# Patient Record
Sex: MALE | Race: BLACK OR AFRICAN AMERICAN | Employment: FULL TIME | URBAN - METROPOLITAN AREA
[De-identification: names, ages, dates, MRNs, and addresses within clinical notes are randomized per-mention and may not be internally consistent; named-entity substitution may affect disease eponyms.]

---

## 2020-03-31 ENCOUNTER — OFFICE VISIT (OUTPATIENT)
Dept: NEUROLOGY | Age: 55
End: 2020-03-31

## 2020-03-31 VITALS
HEIGHT: 71 IN | BODY MASS INDEX: 30.94 KG/M2 | WEIGHT: 221 LBS | DIASTOLIC BLOOD PRESSURE: 98 MMHG | HEART RATE: 77 BPM | TEMPERATURE: 98.7 F | OXYGEN SATURATION: 97 % | RESPIRATION RATE: 18 BRPM | SYSTOLIC BLOOD PRESSURE: 148 MMHG

## 2020-03-31 DIAGNOSIS — R41.3 MEMORY LOSS: Primary | ICD-10-CM

## 2020-03-31 DIAGNOSIS — R41.0 CONFUSION: ICD-10-CM

## 2020-03-31 RX ORDER — METFORMIN HYDROCHLORIDE 500 MG/1
2 TABLET, EXTENDED RELEASE ORAL 2 TIMES DAILY
COMMUNITY
Start: 2020-03-08

## 2020-03-31 RX ORDER — DAPAGLIFLOZIN 10 MG/1
1 TABLET, FILM COATED ORAL DAILY
COMMUNITY
Start: 2020-03-08

## 2020-03-31 NOTE — PATIENT INSTRUCTIONS
RESULT POLICY If we have ordered testing for you, know that; \"NO NEWS IS GOOD NEWS! \" It is our policy that we know longer call patients with results, nor do we  give test results over the phone. We schedule follow up appointments so that your results can be discussed in person. This allows you to address any questions you have regarding the results. If you choose to go to an imaging center outside of [facility], it is your responsibility to bring imaging report and disc to follow up appointment. If something of concern is revealed on your test, we will contact you to discuss the matter and if needed schedule a sooner follow up appointment. Additionally, results may be found by using the My Chart feature and one of our patient service representatives at the  can give you instructions on how to access this feature to utilize our electronic medical record system. Thank you for your understanding. PRESCRIPTION REFILL POLICY [facility] Statement to Patients April 1, 2014 In an effort to ensure the large volume of patient prescription refills is processed in the most efficient and expeditious manner, we are asking our patients to assist us by calling your Pharmacy for all prescription refills, this will include also your  Mail Order Pharmacy. The pharmacy will contact our office electronically to continue the refill process. Please do not wait until the last minute to call your pharmacy. We need at least 48 hours (2days) to fill prescriptions. We also encourage you to call your pharmacy before going to  your prescription to make sure it is ready. With regard to controlled substance prescription refill requests (narcotic refills) that need to be picked up at our office, we ask your cooperation by providing us with at least 72 hours (3days) notice that you will need a refill. We will not refill narcotic prescription refill requests after 4:00pm on any weekday, Monday through Thursday, or after 2:00pm on Fridays, or on the weekends. We encourage everyone to explore another way of getting your prescription refill request processed using [employer], our patient web portal through our electronic medical record system. [employer] is an efficient and effective way to communicate your medication request directly to the office and  downloadable as an debra on your smart phone . [employer] also features a review functionality that allows you to view your medication list as well as leave messages for your physician. Are you ready to get connected? If so please review the attatched instructions or speak to any of our staff to get you set up right away! Thank you so much for your cooperation. Should you have any questions please contact our Practice Administrator.

## 2020-03-31 NOTE — PROGRESS NOTES
[facility] and [address] at [facility] at [address] [place] [address], [address] [facility] [address]  [phone] Office  [phone]           Referring: No primary care provider on file. Chief Complaint   Patient presents with    New Patient    Memory Loss     27-year-old right-handed gentleman who comes today for evaluation of what he calls short-term memory loss as well as long-term memory loss with increasing frequency. He says that his symptoms first became apparent maybe 1-2 years ago but over the last several months have been increasing in severity. He notes that he has gotten forgetful to the point that he is getting in trouble at work. He works in a real yard. 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 In addition the patient lost a set of work keys. He has no recollection of how he lost him. He has no recollection of the last time he used them. 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  He has never found the work keys. There have been other issues where his supervisor has commented he is not performing as well as he should. He has not gotten a formal reprimand although he has been discussed verbally his shortcomings. His wife also notes that he has been less attentive and sometimes is said to have selective memory. He has no difficulty paying bills and that he does not forget them or pay them twice. He has not gotten lost while driving. No difficulty with speech or language. His mother did have dementia but she was 80. No focal weakness. No palpitations. No fever. No chills.   No illness or injury. No inciting factor for this that he can think of. No changes in medicine relating. Past Medical History:   Diagnosis Date    Diabetes mellitus type II, controlled ([address]     Seasonal allergic rhinitis        Past Surgical History:   Procedure Laterality Date    HX COLONOSCOPY  2019       Current Outpatient Medications   Medication Sig Dispense Refill    metFORMIN ER (GLUCOPHAGE XR) 500 mg tablet Take 2 Tabs by mouth two (2) times a day.  Farxiga 10 mg tab tablet Take 1 Tab by mouth daily. No Known Allergies    Social History     Tobacco Use    Smoking status: Never Smoker    Smokeless tobacco: Never Used   Substance Use Topics    Alcohol use: Yes     Comment: sometimes 1-2 drinks per week     Drug use: Never       Family History   Problem Relation Age of Onset   [employer] Breast Cancer Mother     Dementia Mother     Heart Attack Father        Review of Systems  Pertinent positives and negatives as noted with remainder of comprehensive review negative      Examination  Visit Vitals  BP (!) 148/98 (BP 1 Location: Left arm, BP Patient Position: Sitting)   Pulse 77   Temp 98.7 °F (37.1 °C) (Oral)   Resp 18   Ht 5' 11\" (1.803 m)   Wt 100.2 kg (221 lb)   SpO2 97%   BMI 30.82 kg/m²     Pleasant, well appearing. Dress and grooming are appropriate. No scleral icterus is present. Oropharynx is clear. Supple neck without bruit appreciated. Heart regular. Pulses are symmetrical.  No edema in the lower extremities. Neurologically, he is awake, alert, oriented with normal speech and language. He answers orientation questions, discusses medical history, current events etc.  Visual fields are full to direct confrontational testing. He has sharp disk margins bilaterally. Pupils react bilaterally. He has full versions without nystagmus. Face is symmetrical with symmetrical facial sensation. Tongue and palate are midline.  Shoulder shrug is symmetrical. Hearing is intact to conversation. He has normal bulk and tone. There is no abnormal movement. There is no pronation or drift. He is able to generate full strength in the upper and lower extremities and all muscle groups to direct confrontational testing. Reflexes are symmetrical in the upper and lower extremities bilaterally. No pathologic reflexes are elicited. He has no ataxia or past pointing. Sensory examination is intact to primary modalities and there is no lateralization of sensation. He is able to ambulate without difficulty    Impression/Plan  69-year-old gentleman with complaints of progressive cognitive decline to the point that it is affecting his job as noted above and certainly differential diagnosis would include a premature onset dementia versus processing issue versus attention deficit versus emotional versus structural versus vascular versus ictal versus metabolic versus other. To that end he will get an MRI of the brain as well as a carotid Doppler EEG B12 thyroid function and formal neuropsychological evaluation. He will return at the conclusion of his evaluation. Carine Virk MD    This note was created using voice recognition software. Despite editing, there may be syntax errors. This note will not be viewable in [address]

## 2020-03-31 NOTE — PROGRESS NOTES
Chief Complaint   Patient presents with    New Patient    Memory Loss     Concerned with STM loss, some LTM loss, lack of focus. Always had some issues such as remembering names. Works for [employer] and misplaced work keys which was last straw to seeking medical advice.     Does have some stress at home, going through bankruptcy

## 2020-04-01 LAB
T4 FREE SERPL-MCNC: 1.2 NG/DL (ref 0.82–1.77)
TSH SERPL DL<=0.005 MIU/L-ACNC: 2.74 UIU/ML (ref 0.45–4.5)
VIT B12 SERPL-MCNC: 308 PG/ML (ref 232–1245)

## 2020-04-06 ENCOUNTER — OFFICE VISIT (OUTPATIENT)
Dept: NEUROLOGY | Age: 55
End: 2020-04-06

## 2020-04-06 DIAGNOSIS — R41.3 SHORT-TERM MEMORY LOSS: ICD-10-CM

## 2020-04-06 DIAGNOSIS — G31.84 MILD COGNITIVE IMPAIRMENT: Primary | ICD-10-CM

## 2020-04-06 DIAGNOSIS — Z81.8 FAMILY HISTORY OF DEMENTIA: ICD-10-CM

## 2020-04-06 NOTE — PROGRESS NOTES
[address] [facility],[address]  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 right handed    who was unaccompanied to the initial clinical interview on 4/6/20. Please refer to his medical records for details pertaining to his history. At the start of the appointment, I reviewed the patient's [facility] Epic Chart (including Media scanned in from previous providers) for the active Problem List, all pertinent Past Medical Hx, medications, recent radiologic and laboratory findings. In addition, I reviewed pt's documented Immunization Record and Encounter History. Pursuant to the emergency declaration under the [facility] waiver authority and the [employer] and [employer] Act, this Virtual  Visit was conducted, with appropriate consent obtained, to reduce the patient's risk of exposure to COVID-19 and provide continuity of care   Services were provided in this manner to substitute for in-person clinic visit. The originating site is the patient's home and the distance site is [facility] at the [facility]. These types of teleneuropsychology/telehealth/telemedicine visits were authorized by the President of the [place], though I/we cannot guarantee what a third party payor will do reimbursement/coverage wise. I indicated that I would evaluate the patient and recommend diagnostics and treatment based on my assessment and impressions, and that our sessions are not being recorded and that personal health information is protected to the best of our abilities.         The patient reported completing college without history of previously diagnosed LD and/or receipt of special education service. He is a  with the [employer]. The patient has beenn noticing problems with both short and long term memory. He lost keys. Problems began 2 years ago and progressively worse over the past month. Starts tasks and does not complete. He had a switch key which was used to unlock a switchbox, multiple ones. 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  He has never found the work keys. There have been other issues where his supervisor has commented he is not performing as well as he should. He has not gotten a formal reprimand although he has been discussed verbally his shortcomings. His wife has also noticed those type of issues about it as well. He has no problems with driving, medications, finances, day-to-day chores, etc. He has a hard time with focus and attention. No background stroke, meningitis/encephalitis, BALJIT Fever, Lupus, Lyme, TBI, etc, etc.  He sometimes forgets names. His mother had dementia. He has no acute or focal issues. No palpitations. No fever. No chills. No illness or injury. No inciting factor for this that he can think of. No changes in medicine relating. He does have diabetic and manages his sugars appropriately. He uses a CPAP device at night. No prior psychiatric history. Anxious about his health though. No previous neuropsych.        MRI and Doppler's are pending    Neuropsychological Mental Status Exam (NMSE):    Historian: Good  Praxis: No UE apraxia  R/L Orientation: Intact to self and to other  Dress: within normal limits   Weight:Overweight  Appearance/Hygiene: within normal limits   Gait: within normal limits   Assistive Devices: Glasses  Mood: within normal limits   Affect: within normal limits   Comprehension: within normal limits   Thought Process: within normal limits   Expressive Language: within normal limits   Receptive Language: within normal limits   Motor:  No cognitive or motor perseveration  ETOH: 1-2 drinks a week, not more  Tobacco: Denied  Illicit: Denied  SI/HI: Denied  Psychosis: Denied  Insight: Within normal limits  Judgment: Within normal limits  Other Psych:      Past Medical History:   Diagnosis Date    Diabetes mellitus type II, controlled ([facility] [address] [facility]     Seasonal allergic rhinitis        Past Surgical History:   Procedure Laterality Date    HX COLONOSCOPY  2019       No Known Allergies    Family History   Problem Relation Age of Onset    Breast Cancer Mother     Dementia Mother     Heart Attack Father     Heart Disease Other     Cancer Neg Hx     Diabetes Neg Hx     Hypertension Neg Hx     Stroke Neg Hx        Social History     Tobacco Use    Smoking status: Never Smoker    Smokeless tobacco: Never Used   Substance Use Topics    Alcohol use: Yes     Comment: sometimes 1-2 drinks per week     Drug use: Never       Current Outpatient Medications   Medication Sig Dispense Refill    metFORMIN ER (GLUCOPHAGE XR) 500 mg tablet Take 2 Tabs by mouth two (2) times a day.  Farxiga 10 mg tab tablet Take 1 Tab by mouth daily.  oxyCODONE-acetaminophen (PERCOCET) 5-325 mg per tablet Take 1-2 Tabs by mouth every four (4) hours as needed for Pain. 30 Tab 0    ondansetron (ZOFRAN ODT) 4 mg disintegrating tablet Take 1-2 tablets every 6-8 hours as needed for nausea and vomiting. 10 Tab 1         Plan:  Obtain authorization for testing from insurance company. Report to follow once testing, scoring, and interpretation completed. ? Organic based neurocognitive issues versus mood disorder or combination of same. ? Problems organic, functional, or both?  This note will not be viewable in MyChart.

## 2020-04-30 ENCOUNTER — TELEPHONE (OUTPATIENT)
Dept: NEUROLOGY | Age: 55
End: 2020-04-30

## 2020-04-30 NOTE — TELEPHONE ENCOUNTER
----- Message from Windsor Cooks sent at 4/30/2020 11:31 AM EDT -----  Regarding: Dr Eren Cjea first and last name: Sebastián Bray from [facility]      Reason for call:the pt MRI was approved and auth  and will need the original order  faxed to  [facility].  at  (f) [phone], needs to be faxed before his appt on Monday 5/4/2020  at 10:00am       Callback required yes/no and why:no only if there are any further questions       Best contact number(s): [phone]      Details to clarify the request:      Windsor Cooks

## 2020-04-30 NOTE — TELEPHONE ENCOUNTER
[facility] to get additional information and order for the MRI  Best # [phone]

## 2020-05-06 ENCOUNTER — OFFICE VISIT (OUTPATIENT)
Dept: NEUROLOGY | Age: 55
End: 2020-05-06

## 2020-05-06 DIAGNOSIS — G31.84 MILD COGNITIVE IMPAIRMENT: Primary | ICD-10-CM

## 2020-05-11 NOTE — PROCEDURES
EEG:      Date:  05/06/20    Requesting Physician:  Adria Heck. MD Jada    An EEG is requested in this 12-year-old man with confusion and memory loss to evaluate for epileptiform abnormality. Medications:  Medications are listed as Zofran, Farxiga and Glucophage. This tracing is obtained during the awake state. During wakefulness the background consists of diffuse low voltage, fast frequency beta wave activity. Hyperventilation is not performed secondary to COVID-19 precautions. Intermittent photic stimulation induces symmetric posterior driving responses. Sleep is not attained. Interpretation:   This EEG recorded during the awake state is normal.

## 2020-06-15 ENCOUNTER — OFFICE VISIT (OUTPATIENT)
Dept: NEUROLOGY | Age: 55
End: 2020-06-15

## 2020-06-15 VITALS — TEMPERATURE: 96.7 F

## 2020-06-15 DIAGNOSIS — G31.84 MILD COGNITIVE IMPAIRMENT: Primary | ICD-10-CM

## 2020-06-15 DIAGNOSIS — Z81.8 FAMILY HISTORY OF DEMENTIA: ICD-10-CM

## 2020-06-15 DIAGNOSIS — F41.8 ANXIETY ABOUT HEALTH: ICD-10-CM

## 2020-06-15 DIAGNOSIS — R41.840 ATTENTION DEFICIT: ICD-10-CM

## 2020-06-16 NOTE — PROGRESS NOTES
[address] [facility],[address]  [address]   [place] [address] [facility], [address]   [phone] Office   [phone] Fax      Neuropsychological Evaluation Report  Referral:  Walter Black MD, . Wai Vargas is a 60 y.o. right handed   male who was unaccompanied to the initial clinical interview on 4/6/20. Please refer to his medical records for details pertaining to his history. At the start of the appointment, I reviewed the patient's [facility] Epic Chart (including Media scanned in from previous providers) for the active Problem List, all pertinent Past Medical Hx, medications, recent radiologic and laboratory findings. In addition, I reviewed pt's documented Immunization Record and Encounter History. Pursuant to the emergency declaration under the [facility], [facility] waiver authority and the [employer] Act, this Virtual  Visit was conducted, with appropriate consent obtained, to reduce the patient's risk of exposure to COVID-19 and provide continuity of care   Services were provided in this manner to substitute for in-person clinic visit. The originating site is the patient's home and the distance site is [facility] at the [facility]. These types of teleneuropsychology/telehealth/telemedicine visits were authorized by the President of the [place], though I/we cannot guarantee what a third party payor will do reimbursement/coverage wise. I indicated that I would evaluate the patient and recommend diagnostics and treatment based on my assessment and impressions, and that our sessions are not being recorded and that personal health information is protected to the best of our abilities.         The patient reported completing college without history of previously diagnosed LD and/or receipt of special education service. He is a  with the [employer]. The patient has beenn noticing problems with both short and long term memory. He lost keys. Problems began 2 years ago and progressively worse over the past month. Starts tasks and does not complete. He had a switch key which was used to unlock a switchbox, multiple ones. 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  He has never found the work keys. There have been other issues where his supervisor has commented he is not performing as well as he should. He has not gotten a formal reprimand although he has been discussed verbally his shortcomings. His wife has also noticed those type of issues about it as well. He has no problems with driving, medications, finances, day-to-day chores, etc. He has a hard time with focus and attention. No background stroke, meningitis/encephalitis, BALJIT Fever, Lupus, Lyme, TBI, etc, etc.  He sometimes forgets names. His mother had dementia. He has no acute or focal issues. No palpitations. No fever. No chills. No illness or injury. No inciting factor for this that he can think of, and he doesn't report any changes in his meds. He is a diabetic and reports that he is good about managing his sugars. Uses CPAP consistently. No prior psychiatric history. Anxious about his health though. No previous neuropsych.        MRI and Doppler's are pending    Neuropsychological Mental Status Exam (NMSE):    Historian: Good  Praxis: No UE apraxia  R/L Orientation: Intact to self and to other  Dress: within normal limits   Weight:Overweight  Appearance/Hygiene: within normal limits   Gait: within normal limits   Assistive Devices: Glasses  Mood: within normal limits   Affect: within normal limits   Comprehension: within normal limits   Thought Process: within normal limits   Expressive Language: within normal limits   Receptive Language: within normal limits   Motor:  No cognitive or motor perseveration  ETOH: 1-2 drinks a week, not more  Tobacco: Denied  Illicit: Denied  SI/HI: Denied  Psychosis: Denied  Insight: Within normal limits  Judgment: Within normal limits  Other Psych:      Past Medical History:   Diagnosis Date    Diabetes mellitus type II, controlled ([facility] [address]     Seasonal allergic rhinitis        Past Surgical History:   Procedure Laterality Date    HX COLONOSCOPY  2019       No Known Allergies    Family History   Problem Relation Age of Onset    Breast Cancer Mother     Dementia Mother     Heart Attack Father     Heart Disease Other     Cancer Neg Hx     Diabetes Neg Hx     Hypertension Neg Hx     Stroke Neg Hx        Social History     Tobacco Use    Smoking status: Never Smoker    Smokeless tobacco: Never Used   Substance Use Topics    Alcohol use: Yes     Comment: sometimes 1-2 drinks per week     Drug use: Never       Current Outpatient Medications   Medication Sig Dispense Refill    metFORMIN ER (GLUCOPHAGE XR) 500 mg tablet Take 2 Tabs by mouth two (2) times a day.  Farxiga 10 mg tab tablet Take 1 Tab by mouth daily.  oxyCODONE-acetaminophen (PERCOCET) 5-325 mg per tablet Take 1-2 Tabs by mouth every four (4) hours as needed for Pain. 30 Tab 0    ondansetron (ZOFRAN ODT) 4 mg disintegrating tablet Take 1-2 tablets every 6-8 hours as needed for nausea and vomiting. 10 Tab 1         Plan:  Obtain authorization for testing from insurance company. Report to follow once testing, scoring, and interpretation completed. ? Organic based neurocognitive issues versus mood disorder or combination of same. ? Problems organic, functional, or both?  This note will not be viewable in [facility]. Neuropsychological Test Results  Patient Testing 6/15/20 Report Completed 6/16/20  A Psychometrist Assisted w/ portions of this evaluation while under my direct supervision    The following assessment procedures were performed:      Neuropsychologist Performed, Interpreted, & Reported: Neuropsychological Mental Status Exam, Revised Memory & Behavior Checklist, Mini Mental State Exam, Clock Drawing Test, Test Of Premorbid Functioning, Brandon-Melzack Pain Questionnaire,  History Taking  & Clinical Interview With The Patient,  CPT-III, CARISSA, Review Of Available Records. Psychometrist Administered & Neuropsychologist Interpreted & Neuropsychologist Reported: Finger Tapping Test, Grooved Pegboard Test,Wechsler Adult Intelligence Scale  IV, Verbal Fluency Tests, Paul & Paul  Revised, Trailmaking Test Parts A & B, [place]page Learning Test  3, Jose Juan Complex Figure Test, Hensley Depression Inventory  II, Hensley Anxiety Inventory. Test Findings:  Test Findings:  Note:  The patients raw data have been compared with currently available norms which include demographic corrections for age, gender, and/or education. Sometimes, the patients scores are compared to demographically similar individuals as close to the patients age, education level, etc., as possible. \"Average\" is viewed as being +/- 1 standard deviation (SD) from the stated mean for a particular test score. \"Low average\" is viewed as being between 1 and 2 SD below the mean, and above average is viewed as being 1 and 2 SD above the mean. Scores falling in the borderline range (between 1-1/2 and 2 SD below the mean) are viewed with particular attention as to whether they are normal or abnormal neurocognitive test scores. Other methods of inference in analyzing the test data are also utilized, including the pattern and range of scores in the profile, bilateral motor functions, and the presence, if any, of pathognomonic signs. Behaviorally, the patient was friendly and cooperative and appeared motivated to perform well during this examination. Within this context, the results of this evaluation are viewed as a valid reflection of the patients actual neurocognitive and emotional status. His structured word list fluency, as assessed by the FAS Test, was within the average range with a T score of 46. Category fluency was within the average range with a T score of 51. Confrontation naming ability, as assessed by the [facility] Revised, was within the average range at 52/60 correct (T = 45). This pattern of performance is not indicative of a patient who is at increased risk for day-to-day problems with verbal fluency and confrontation naming was also normal.        The patient was administered the [facility] Continuous Performance Test  III, a computer-administered test of sustained attention, and review of the subscales within this instrument revealed mild to moderate concerns for inattentiveness without impulsivity. This pattern of performance is indicative of a patient who is at increased risk for day-to-day problems with sustained visual attention/concentration. The patient was administered the Wechsler Adult Intelligence Scale  IV. There was no clinically significant difference between his average range Working Memory Index score of 102 (55th %ile) and his average range Processing Speed Index score of 94 (34th %ile). This pattern of performance is not indicative of a patient who is at increased risk for day-to-day problems with working memory and processing speed. His Verbal Comprehension Index score of 95 (37th %ile) was within the average range and his Perceptual Reasoning Index score of 102 (55th %ile) was average. See Appendix I (scanned into media section of this EMR) for full breakdown of IQ test scores.   Scores commensurate with what would be expected based on his performance on a measure assessing premorbid functioning levels. The patient was administered the [place] Verbal Learning Test  - 3 and generated a normal range (and positive) learning curve over five repeated auditory word list learning trials. An interference trial was within normal limits. Free and cued, short and long delayed recall were all within normal limits Recognition and forced choice recall were within normal limits. This pattern of performance is not  indicative of a patient who is at increased risk for day-to-day problems with auditory learning and/or memory. The patients performance on the copy portion of the Jose Juan Complex Figure Test was within normal limits. Recall for the complex design was within normal limits after both short and long  delays. Recognition recall was within normal limits. This pattern of performance is not  indicative of a patient who is at increased risk for day-to-day problems with visual organization and visual delayed memory. Simple timed visual motor sequencing (Trailmaking Test Part A) was within the below average range with a T score of 40. His performance on a similar, but more complex task of timed visual motor sequencing (Trailmaking Test Part B) was within the average range with a T score of 49. Taken together, this pattern of performance is not indicative of a patient who is at increased risk for day-to-day problems with executive functioning. Motor speed for finger tapping was mildly impaired for his dominant hand (T = 36) and average for his nondominant hand (T-50). Fine motor dexterity was within the above average range bilaterally. This does not provide strong support for a focal or lateralized issue and neurologic correlation is indicated. The patient rated his current level of pain as \"0/5- No Pain\" on the Brandon-Melzack Pain Questionnaire. His Hensley Depression Inventory II score of 1 was within the minimally depressed range.   His Vonzell Messing Anxiety Inventory score of 0 reflected minimal anxiety. The patient was administered the Personality Assessment Inventory and a high level of defensiveness is noted in terms of validity testing. Within this context, this is a normal range personality profile without evidence of clinically significant psychopathology. Impressions & Recommendations: This patient generated a predominantly normal range Neuropsychological Evaluation with respect to neurocognitive functioning. In this regard, the only impairment noted is for sustained visual attention and he is also showing a mild problem with dominant handed motor speed. At the same time, his performances across all other neurocognitive domains assessed, including mental status, verbal fluency, confrontation naming, auditory learning, auditory memory, visual organization, visual memory, working memory, processing speed, verbal comprehension perceptual reasoning, bilateral fine motor dexterity, nondominant handed motor speed, and executive functioning remain normal.  From an emotional standpoint, the patient was mildly defensive in terms of his response style on personality testing and reports mild anxiety about his health without report of more major psychopathology. Thankfully, this profile is not consistent with dementia or MCI. Instead, there is evidence of a more chronic attention deficit now compounded by age. He denies a major psychopathological issue. In addition to continued medical care, my recommendations include consideration for an appropriate medication for attention if not medically contraindicated. Monitor emotional status over time. Exam is reassuring. Stay active mentally, physically, and socially. Not concerned about competency, driving, day-to-day supervision, etc.  Baseline now established. Follow up prn. Clinical correlation is, of course, indicated.         I will discuss these findings with the patient when he follows up with me in the near future. A follow up Neuropsychological Evaluation is indicated on a prn basis, especially if there are any cognitive and/or emotional changes. DIAGNOSES:  The Referral Diagnosis of MCI  - IS NOT SUPPORTED        Chronic ADHD, Inattentive, Mild To Moderate     Anxiety About Health       The above information is based upon information currently available to me. If there is any additional information of which I am currently unaware, I would be more than happy to review it upon having it made available to me. Thank you for the opportunity to see this interesting individual.     Sincerely,       Mike Haque. Leighann Ocampo, EdS        dd  CC:  Katey Sanchez MD, Dr. Kenna Ford      Time Documentation:      37015 x1 &  46800 x 1 Neuropsych testing/data gathering by Neuropsychologist (60 minutes)     [phone] x 1  96139 x 7 Test Administration/Data Gathering By Technician: (4 hours). 17496 x 1 (first 30 minutes), 36906 x 7 (each additional 30 minutes)    96132 x 1  96133 x 1 Testing Evaluation Services by Neuropsychologist (1 hour, 50 minutes) 96132 x 1 (first hour), 96133 x 1 (50 minutes)    Definitions:      25617/31901:  Neurobehavioral Status Exam, Clinical interview. Clinical assessment of thinking, reasoning and judgment, by neuropsychologist, both face to face time with patient and time interpreting those test results and reporting, first and subsequent hours)    95125/22321: Neuropsychological Test Administration by Technician/Psychometrist, first 30 minutes and each additional 30 minutes. The above includes: Record review. Review of history provided by patient. Review of collaborative information. Testing by Clinician. Review of raw data. Scoring. Report writing of individual tests administered by Clinician.   Integration of individual tests administered by psychometrist with NSE/testing by clinician, review of records/history/collaborative information, case Conceptualization, treatment planning, clinical decision making, report writing, coordination Of Care. Psychometry test codes as time spent by psychometrist administering and scoring neurocognitive/psychological tests under supervision of neuropsychologist.  Integral services including scoring of raw data, data interpretation, case conceptualization, report writing etcetera were initiated after the patient finished testing/raw data collected and was completed on the date the report was signed.

## 2020-06-26 DIAGNOSIS — R41.3 MEMORY LOSS: ICD-10-CM

## 2020-06-26 DIAGNOSIS — R41.0 CONFUSION: ICD-10-CM

## 2020-06-29 ENCOUNTER — TELEPHONE (OUTPATIENT)
Dept: NEUROLOGY | Age: 55
End: 2020-06-29

## 2020-06-29 NOTE — TELEPHONE ENCOUNTER
----- Message from Ava Mason sent at 2020 11:12 AM EDT -----  Regarding: OM[facility]/TELEPHONE  Contact: [phone]  General Message/Vendor Call      Patient's first and last name: Willem Dumont  : 1965  ID numbers:#[id number]    Caller's first and last name:  Willem Dumont  Reason for call: Status of test result being fax  Callback required yes/no and why: Yes  Best contact number(s): [phone]    Details to clarify the request: Patient would like to know the status of his test results from 06/15/20 to be fax to [facility] at [phone] to the attention of Eileen Storm.

## 2020-07-13 ENCOUNTER — OFFICE VISIT (OUTPATIENT)
Dept: NEUROLOGY | Age: 55
End: 2020-07-13

## 2020-07-13 VITALS — TEMPERATURE: 96.6 F

## 2020-07-14 ENCOUNTER — OFFICE VISIT (OUTPATIENT)
Dept: NEUROLOGY | Age: 55
End: 2020-07-14

## 2020-07-14 VITALS
OXYGEN SATURATION: 98 % | HEART RATE: 68 BPM | TEMPERATURE: 97.5 F | WEIGHT: 220.9 LBS | SYSTOLIC BLOOD PRESSURE: 112 MMHG | RESPIRATION RATE: 16 BRPM | DIASTOLIC BLOOD PRESSURE: 66 MMHG | BODY MASS INDEX: 30.81 KG/M2

## 2020-07-14 DIAGNOSIS — F98.8 ATTENTION DEFICIT DISORDER, UNSPECIFIED HYPERACTIVITY PRESENCE: Primary | ICD-10-CM

## 2020-07-14 RX ORDER — AMLODIPINE BESYLATE 5 MG/1
TABLET ORAL
COMMUNITY
Start: 2020-06-10

## 2020-07-14 RX ORDER — LISINOPRIL AND HYDROCHLOROTHIAZIDE 12.5; 2 MG/1; MG/1
TABLET ORAL
COMMUNITY
Start: 2020-06-11

## 2020-07-14 NOTE — LETTER
NOTIFICATION RETURN TO WORK 
 
7/14/2020 9:36 AM 
 
Mr. Renetta Butler [address] [address] To Whom It May Concern: 
 
Renetta Butler is currently under the care of [facility]. He is able to return to work at full capacity. He does not need any further testing at this time. We prescribed him Vyvanse 30 mg to take every morning to aid with attention deficit which was shown through recent testing and he will follow up with me in October to see how he is doing on this medication. If there are questions or concerns please have the patient contact our office. Sincerely, Jarred Camarillo MD

## 2020-07-14 NOTE — LETTER
7/14/20 Patient: Dwight Hicsk YOB: 1965 Date of Visit: 7/14/2020 Vane Griffin MD 
[address] [facility] [address] [place] [address] VIA Facsimile: [phone] Dear Vane Griffin MD, Thank you for referring Mr. Dwight Hicks to [facility] for evaluation. My notes for this consultation are attached. If you have questions, please do not hesitate to call me. I look forward to following your patient along with you. Sincerely, Durga Munoz MD

## 2020-07-14 NOTE — PROGRESS NOTES
[facility] and [address] at [facility] at [address], [address] [facility] [address] [facility], [address] [place] [address]   [phone]              Chief Complaint   Patient presents with    Results     mri eeg, dop, Dr. Evelio Benavdiez     Current Outpatient Medications   Medication Sig Dispense Refill    amLODIPine (NORVASC) 5 mg tablet       lisinopril-hydroCHLOROthiazide (PRINZIDE, ZESTORETIC) 20-12.5 mg per tablet       metFORMIN ER (GLUCOPHAGE XR) 500 mg tablet Take 2 Tabs by mouth two (2) times a day.  Farxiga 10 mg tab tablet Take 1 Tab by mouth daily.  ondansetron (ZOFRAN ODT) 4 mg disintegrating tablet Take 1-2 tablets every 6-8 hours as needed for nausea and vomiting. 10 Tab 1    oxyCODONE-acetaminophen (PERCOCET) 5-325 mg per tablet Take 1-2 Tabs by mouth every four (4) hours as needed for Pain. 30 Tab 0      No Known Allergies  Social History     Tobacco Use    Smoking status: Never Smoker    Smokeless tobacco: Never Used   Substance Use Topics    Alcohol use: Yes     Comment: sometimes 1-2 drinks per week     Drug use: Never   Subjective:  47-year-old gentleman, who returns today in follow up. I saw him March 31st for complaints of memory difficulty and progressive cognitive decline. I sent him for several studies, including an MRI of the brain. I reviewed that scan this morning and this was normal.  In addition, he underwent carotid doppler with no significant stenosis. His EEG was normal.  Laboratory analyses, including B12, thyroid function, normal.  He had his neuropsychological evaluation with Dr. Zulema Escoto on the 16th of June and per Dr. Alexander Cerna report: \"This patient generated a predominantly normal range of neuropsychological evaluation with respect to neurocognitive functioning. ..this profile is not consistent with dementia or MCI, instead there is evidence of a chronic attention deficit, not compounded by age. \"          Today he reports no significant change. His employer has put him out on FMLA, awaiting the results of his testing. Today we discussed his test results, discussed that his memory scores were good and we did find attention deficit, and that is a good thing because we can treat that. Discussed options. To that end, we will start Vyvanse 30 mg once daily. Discussed administration, potential side effects, potential benefits and alternatives. We will continue him on this dose for the next 2-3 months and then decide whether we need to increase that dose or not. In terms of him returning to work, I think that is quite appropriate. Examination  Visit Vitals  /66 (BP 1 Location: Left arm, BP Patient Position: Sitting)   Pulse 68   Temp 97.5 °F (36.4 °C)   Resp 16   Wt 100.2 kg (220 lb 14.4 oz)   SpO2 98%   BMI 30.81 kg/m²       Total time: 25 min   Counseling / coordination time: 15 min   > 50% counseling / coordination?: Yes re: as documented above      This note was created using voice recognition software. Despite editing, there may be syntax errors. This note will not be viewable in [address]

## 2020-07-22 ENCOUNTER — TELEPHONE (OUTPATIENT)
Dept: NEUROLOGY | Age: 55
End: 2020-07-22

## 2020-07-22 NOTE — TELEPHONE ENCOUNTER
Prior authorization APPROVED for Vyvanse by [employer]. Effective dates 07/22/20 - 07/22/23. Case #[id number]. Approval will be scanned into media.  Pharmacy notified of approval.

## 2020-07-22 NOTE — TELEPHONE ENCOUNTER
Patient wanted to schedule f/u for two weeks after he starts taking Vyvanse, per Dr. Geetha Nunez instructions. Informed him I could not schedule due to system updating to Epic for scheduling. He asked if he could be fit in sometime around 08/06/20 if possible.

## 2020-07-22 NOTE — TELEPHONE ENCOUNTER
Prior Authorization submitted for Vyvanse to [facility] via Cover My Meds. Status Pending. Allow 24-72 hours for response.      [facility] Key: [id number]  Case #: [id number]

## 2020-07-24 NOTE — TELEPHONE ENCOUNTER
Called pt, he states his company MD will not allow him back to work until he is on the Vyvanse for a couple of weeks and Dr. Puneet Viramontes states he is having no SE/adverse rxn to it and he is safe to return to work.     appt scheduled for Monday, August 10, 2020 10:10 AM

## 2020-08-10 ENCOUNTER — OFFICE VISIT (OUTPATIENT)
Dept: NEUROLOGY | Age: 55
End: 2020-08-10
Payer: COMMERCIAL

## 2020-08-10 VITALS
TEMPERATURE: 96.8 F | HEIGHT: 71 IN | RESPIRATION RATE: 16 BRPM | BODY MASS INDEX: 29.54 KG/M2 | SYSTOLIC BLOOD PRESSURE: 102 MMHG | DIASTOLIC BLOOD PRESSURE: 70 MMHG | OXYGEN SATURATION: 97 % | HEART RATE: 84 BPM | WEIGHT: 211 LBS

## 2020-08-10 DIAGNOSIS — F98.8 ATTENTION DEFICIT DISORDER, UNSPECIFIED HYPERACTIVITY PRESENCE: Primary | ICD-10-CM

## 2020-08-10 PROCEDURE — 99213 OFFICE O/P EST LOW 20 MIN: CPT | Performed by: PSYCHIATRY & NEUROLOGY

## 2020-08-10 RX ORDER — AMOXICILLIN 500 MG/1
CAPSULE ORAL
COMMUNITY
Start: 2020-07-24 | End: 2020-08-10 | Stop reason: ALTCHOICE

## 2020-08-10 RX ORDER — IBUPROFEN 800 MG/1
800 TABLET ORAL
COMMUNITY
Start: 2020-08-05

## 2020-08-10 NOTE — LETTER
8/10/20 Patient: Desirae Hernandez YOB: 1965 Date of Visit: 8/10/2020 Melchor Frausto MD 
[address] [address] VIA Facsimile: [phone] Dear Melchor Frausto MD, Thank you for referring Mr. Desirae Hernandez to [facility] for evaluation. My notes for this consultation are attached. If you have questions, please do not hesitate to call me. I look forward to following your patient along with you. Sincerely, Bal Ratliff MD

## 2020-08-10 NOTE — LETTER
NOTIFICATION RETURN TO WORK  
 
8/10/2020 10:47 AM 
 
Mr. Renetta Butler [address] [address] [facility] [address] To Whom It May Concern: 
 
Renetta Butler is currently under the care of [facility]. He has had great response to his medication Vyvanse 30 mg and I have no objections to him returning to works as soon as possible. If there are questions or concerns please have the patient contact our office. Sincerely, Jarred Camarillo MD

## 2020-08-10 NOTE — PROGRESS NOTES
Chief Complaint   Patient presents with    Attention Deficit Disorder     Had to r/s appt sooner that scheduled appt in Oct d/t company MD needing pt to have med check in a few wks after starting vyvanse    Does not know if it has made a difference with focus but definitely has more energy

## 2020-08-28 DIAGNOSIS — F98.8 ATTENTION DEFICIT DISORDER, UNSPECIFIED HYPERACTIVITY PRESENCE: ICD-10-CM

## 2020-08-28 NOTE — TELEPHONE ENCOUNTER
----- Message from Americo Duane sent at 8/27/2020  4:26 PM EDT -----  Regarding: Dr. Lulu Friend Medication Refill    Caller (if not patient):      Relationship of caller (if not patient):      Best contact number(s):      Name of medication and dosage if known: Vyvanse 30mg, 90 day supply      Is patient out of this medication (yes/no):  No      Pharmacy name: [facility] listed in chart? (yes/no): Yes     Pharmacy phone number: [phone]      Details to clarify the request:      Americo Duane

## 2020-10-15 ENCOUNTER — OFFICE VISIT (OUTPATIENT)
Dept: NEUROLOGY | Age: 55
End: 2020-10-15
Payer: COMMERCIAL

## 2020-10-15 VITALS
TEMPERATURE: 97.7 F | WEIGHT: 211 LBS | DIASTOLIC BLOOD PRESSURE: 72 MMHG | SYSTOLIC BLOOD PRESSURE: 118 MMHG | HEART RATE: 102 BPM | BODY MASS INDEX: 29.43 KG/M2 | OXYGEN SATURATION: 98 %

## 2020-10-15 DIAGNOSIS — F98.8 ATTENTION DEFICIT DISORDER, UNSPECIFIED HYPERACTIVITY PRESENCE: Primary | ICD-10-CM

## 2020-10-15 PROCEDURE — 99213 OFFICE O/P EST LOW 20 MIN: CPT | Performed by: PSYCHIATRY & NEUROLOGY

## 2020-10-15 NOTE — LETTER
10/15/20 Patient: Sebastian Og YOB: 1965 Date of Visit: 10/15/2020 Neelima Pulido MD 
[address] [address] VIA Facsimile: [phone] Dear Neelima Pulido MD, Thank you for referring Mr. Sebastian Og to [facility] for evaluation. My notes for this consultation are attached. If you have questions, please do not hesitate to call me. I look forward to following your patient along with you. Sincerely, Brian Zhao MD

## 2020-10-15 NOTE — PROGRESS NOTES
[facility] and [address] at [facility] at [address] [facility], [address] [facility] [address] [facility], [address] [place] [address]   [phone]              Chief Complaint   Patient presents with    Follow-up     Current Outpatient Medications   Medication Sig Dispense Refill    lisdexamfetamine (Vyvanse) 30 mg capsule Take 1 Cap by mouth daily. Max Daily Amount: 30 mg. 30 Cap 0    ibuprofen (MOTRIN) 800 mg tablet Take 800 mg by mouth every eight (8) hours as needed.  amLODIPine (NORVASC) 5 mg tablet       lisinopril-hydroCHLOROthiazide (PRINZIDE, ZESTORETIC) 20-12.5 mg per tablet       metFORMIN ER (GLUCOPHAGE XR) 500 mg tablet Take 2 Tabs by mouth two (2) times a day.  Farxiga 10 mg tab tablet Take 1 Tab by mouth daily.  ondansetron (ZOFRAN ODT) 4 mg disintegrating tablet Take 1-2 tablets every 6-8 hours as needed for nausea and vomiting. 10 Tab 1      No Known Allergies  Social History     Tobacco Use    Smoking status: Never Smoker    Smokeless tobacco: Never Used   Substance Use Topics    Alcohol use: Yes     Comment: sometimes 1-2 drinks per week     Drug use: Never     47-year-old male returns today for follow-up regarding attention deficit. I last saw him August 10 and at that time he was on Vyvanse 30 mg daily. Was doing well with that. He has had oncological evaluation  He has had formal neuropsychological evaluation which did not demonstrate any memory difficulty. He had attention deficit. The profile was not consistent with dementia or MCI. Today he reports the Vyvanse is helpful. He tolerates it well. He does not take it on the weekends. If he gets a good night sleep and is not going to have a busy day then he will defer taking it. He takes it probably 3-4 times per week. No side effects.   Has questions about Prevacid and we    Examination  Visit Vitals  /72 (BP 1 Location: Left arm, BP Patient Position: Sitting)   Pulse (!) 102   Temp 97.7 °F (36.5 °C)   Wt 95.7 kg (211 lb)   SpO2 98%   BMI 29.43 kg/m²     Awake alert oriented and conversant. Normal speech and language. Discusses current events, medical history etc.  No icterus. No ataxia. Steady    Impression/Plan  Attention deficit doing well on Vyvanse. Continue this. Follow in 6 months    Stephen Hou MD      This note was created using voice recognition software. Despite editing, there may be syntax errors. This note will not be viewable in [address]

## 2020-10-15 NOTE — PROGRESS NOTES
vyvanse seems to be helping. Gives him a boost of energy. Wanted to discuss medications to help with memory. Saw a commercial for prevagen.

## 2021-04-14 ENCOUNTER — TELEPHONE (OUTPATIENT)
Dept: NEUROLOGY | Age: 56
End: 2021-04-14

## 2021-04-14 NOTE — TELEPHONE ENCOUNTER
RE [address] VIA [facility] Key: [address]

## 2021-04-14 NOTE — TELEPHONE ENCOUNTER
RE [facility].    Prior authorization APPROVED for Vyvanse by Fabio Jefferson. Effective dates 07/22/20 - 07/22/23. Case #[id number]. Approval will be scanned into media.  Pharmacy notified of approval.

## 2021-04-14 NOTE — TELEPHONE ENCOUNTER
----- Message from Sae Wellington sent at 4/13/2021 11:20 AM EDT -----  Regarding: Dr. David Almazan (if not patient):  PT       Relationship of caller (if not patient):      Best contact number(s):  [phone]      Name of medication and dosage if known:  Requesting Prior Authorization needed \"Vyvanse - dosage unknown)      Is patient out of this medication (yes/no): No      Pharmacy name:  [facility] listed in chart? (yes/no):  Yes  Pharmacy phone number:   [phone]      Details to clarify the request:  Pt cancelled his appt on Thurs, 04/15/21 at 9:45AM, due to his work scheduled; he will call back to r/s       Sae Wellington

## 2023-09-08 NOTE — PROGRESS NOTES
[facility] and [address] at [facility] at [address] [facility], [address] [facility] [address] [facility], [address] [place] [address]   [phone]              Chief Complaint   Patient presents with    Attention Deficit Disorder     Current Outpatient Medications   Medication Sig Dispense Refill    ibuprofen (MOTRIN) 800 mg tablet Take 800 mg by mouth every eight (8) hours as needed.  amLODIPine (NORVASC) 5 mg tablet       lisinopril-hydroCHLOROthiazide (PRINZIDE, ZESTORETIC) 20-12.5 mg per tablet       lisdexamfetamine (Vyvanse) 30 mg capsule Take 1 Cap by mouth daily. Max Daily Amount: 30 mg. 30 Cap 0    metFORMIN ER (GLUCOPHAGE XR) 500 mg tablet Take 2 Tabs by mouth two (2) times a day.  Farxiga 10 mg tab tablet Take 1 Tab by mouth daily.  ondansetron (ZOFRAN ODT) 4 mg disintegrating tablet Take 1-2 tablets every 6-8 hours as needed for nausea and vomiting. 10 Tab 1      No Known Allergies  Social History     Tobacco Use    Smoking status: Never Smoker    Smokeless tobacco: Never Used   Substance Use Topics    Alcohol use: Yes     Comment: sometimes 1-2 drinks per week     Drug use: Never     57-year-old man returns today for follow-up. His last visit with me was 714. He has attention deficit disorder. We started him on Vyvanse 30 mg. Today he comes for follow-up to see how effective that has been. Today he reports he has noticed a significant improvement with the Vyvanse. He is tolerating it without side effect. He is able to focus much more clearly and feels a bit more energetic as well. No perceived ill effects. He is looking forward to getting back to work.   He had to stay out of work for another 2 weeks or so after starting the medicine to ensure that he was tolerating it well and that he was getting a good response    Examination  Visit Vitals  /70 (BP 1 Location: Left arm, BP Patient Position: Sitting)   Pulse 84   Temp 96.8 °F (36 °C)   Resp 16   Ht 5' 11\" (1.803 m)   Wt 95.7 kg (211 lb)   SpO2 97%   BMI 29.43 kg/m²     He is a very pleasant gentleman. He is awake alert oriented and conversant. Speech and language normal.  Normal cognition. No ataxia. Impression/Plan  Attention deficit with a positive response to Vyvanse. Continue Vyvanse 30 mg daily. We will go ahead and keep his previously scheduled appointment for routine follow-up. I have no issue with him returning to work and I am aware that he drives a company vehicle and carries a firearm. I am quite happy with the response he has had to the medication. Rodger Lobo MD      This note was created using voice recognition software. Despite editing, there may be syntax errors. This note will not be viewable in [address] [address]